# Patient Record
Sex: FEMALE
[De-identification: names, ages, dates, MRNs, and addresses within clinical notes are randomized per-mention and may not be internally consistent; named-entity substitution may affect disease eponyms.]

---

## 2024-02-23 ENCOUNTER — APPOINTMENT (OUTPATIENT)
Dept: OBGYN | Facility: CLINIC | Age: 29
End: 2024-02-23
Payer: COMMERCIAL

## 2024-02-23 VITALS
BODY MASS INDEX: 20.57 KG/M2 | HEART RATE: 82 BPM | HEIGHT: 66 IN | OXYGEN SATURATION: 99 % | WEIGHT: 128 LBS | DIASTOLIC BLOOD PRESSURE: 73 MMHG | SYSTOLIC BLOOD PRESSURE: 108 MMHG

## 2024-02-23 DIAGNOSIS — Z00.00 ENCOUNTER FOR GENERAL ADULT MEDICAL EXAMINATION W/OUT ABNORMAL FINDINGS: ICD-10-CM

## 2024-02-23 DIAGNOSIS — Z30.09 ENCOUNTER FOR OTHER GENERAL COUNSELING AND ADVICE ON CONTRACEPTION: ICD-10-CM

## 2024-02-23 DIAGNOSIS — N91.4 SECONDARY OLIGOMENORRHEA: ICD-10-CM

## 2024-02-23 DIAGNOSIS — Z78.9 OTHER SPECIFIED HEALTH STATUS: ICD-10-CM

## 2024-02-23 DIAGNOSIS — Z80.49 FAMILY HISTORY OF MALIGNANT NEOPLASM OF OTHER GENITAL ORGANS: ICD-10-CM

## 2024-02-23 DIAGNOSIS — N91.2 AMENORRHEA, UNSPECIFIED: ICD-10-CM

## 2024-02-23 PROCEDURE — 36415 COLL VENOUS BLD VENIPUNCTURE: CPT

## 2024-02-23 PROCEDURE — 99204 OFFICE O/P NEW MOD 45 MIN: CPT

## 2024-02-23 NOTE — PHYSICAL EXAM
[Appropriately responsive] : appropriately responsive [Alert] : alert [No Acute Distress] : no acute distress [Oriented x3] : oriented x3 support person/patient

## 2024-02-23 NOTE — HISTORY OF PRESENT ILLNESS
[N] : Patient denies prior pregnancies [No] : never pregnant [Currently Active] : currently active [Men] : men [Condoms] : Condoms [FreeTextEntry1] : Danielle Yates presents for consultation regarding no period for a couple of years. She states her previous doctor thought it was her thyroid but test shows everything is fine. She recently had a pap and it was normal.

## 2024-02-23 NOTE — PLAN
[FreeTextEntry1] : Danielle Yates presents for consultation regarding no period for a couple of years. She states her previous doctor thought it was her thyroid but test shows everything is fine. She recently had a pap and it was normal.  *I reviewed recent blood work on her phone: tsh normal, tpo normal, fsh normal. estradiol normal amh 3.5 prolactin nl hcg neg pap normal  d/w pt I don't suspect pcos as much as hypothalamicamenorrhea d/w pt her blood work shows she is NOT in premature menopause d/w pt need to get pelvic sono to evaluate uterus s/a: uses condoms d/w pt trial of ocp (she didn't get cycle with progesterone)  TV sono  I did repeat a testosterone because this not done advise ocp challenge because provera did not work  Over 50% of new pt face to face time of visit counseling and coordination of care. (total 45 m)

## 2024-02-27 LAB
FSH SERPL-MCNC: 8.9 IU/L
HCG SERPL-MCNC: <1 MIU/ML
PROGEST SERPL-MCNC: 0.3 NG/ML

## 2024-03-05 LAB
DHEA-SULFATE, SERUM: 218 UG/DL
TESTOST FREE SERPL-MCNC: 2.6 PG/ML
TESTOST SERPL-MCNC: 35 NG/DL

## 2024-05-07 ENCOUNTER — TRANSCRIPTION ENCOUNTER (OUTPATIENT)
Age: 29
End: 2024-05-07

## 2024-05-08 ENCOUNTER — TRANSCRIPTION ENCOUNTER (OUTPATIENT)
Age: 29
End: 2024-05-08

## 2024-05-08 RX ORDER — NORETHINDRONE ACETATE AND ETHINYL ESTRADIOL AND FERROUS FUMARATE 1.5-30(21)
1.5-3 KIT ORAL DAILY
Qty: 3 | Refills: 2 | Status: ACTIVE | COMMUNITY
Start: 2024-02-23 | End: 1900-01-01

## 2024-05-09 ENCOUNTER — TRANSCRIPTION ENCOUNTER (OUTPATIENT)
Age: 29
End: 2024-05-09